# Patient Record
(demographics unavailable — no encounter records)

---

## 2025-02-27 NOTE — HISTORY OF PRESENT ILLNESS
[de-identified] : Abdullahi Gallardo is a 70 y/o M with PMH of pharyngeal cancer (s/p chemotherapy, radiation, and surgical resection) and GERD being referred for intrahepatic cholangiocarcinoma to discuss locoregional and systemic therapy options.   Patient was noted to have liver mass on CTA during work up for chest pain.  CTA 10/14/24 showed no evidence of dissection however a poorly defined hypodense mass in the hepatic dome with possible additional lesion in the inferior right hepatic lobe. These indeterminate but concerning for malignancy.   He had a follow up MRI abdomen 10/25/24 which showed a 4.2 cm lobulated mass within the central liver at the dome. This lesion enhances in the portal venous phase with persistent enhancement into the equilibrium phase. This solitary lesion could represent a cholangiocarcinoma. A metastatic lesion is possible. No ductal dilatation. No morphologic changes of cirrhosis. There were enlarged lymph nodes (around 1.6 mm). These were thought to maybe reflecting metastatic periportal lymphadenopathy. Tumor Markers 10/31/2024 showed CA 19-9 121, CEA 38.7.   He had a biopsy 11/8/2024 which was inconclusive. Repeat liver biopsy 12/12/2024 confirmed adenocarcinoma.  EGD/colonoscopy 11/20/24 DAVID.  He had PET scan 1/3/25 showed 6.4 cm hepatic dome mass and additional 1.5 cm segment 8 hepatic lesion, compatible with primary hepatic carcinoma vs. metastatic disease. Alva hepatic and aortocaval adenopathy. Increased sclerosis in the right iliac bone with mildly increased activity, concerning for metastatic disease given the above findings. Postsurgical changes in the left hypopharyngeal region.   MRI/MRCP 1/7/25 showed severe narrowing involving the midportion of the CBD possibly secondary to mass effect from the enlarging periportal and peripancreatic lymphadenopathy. Additionally there are intraluminal filling defects within the CBD just proximal to this region of stenosis which may reflect choledocholithiasis or tumor involvement. The results and narrowing of the CBD results in severe proximal intra and extrahepatic biliary ductal dilatation. Enlargement of the presumed hepatic neoplastic lesion along the hepatic dome which extends along the central aspects of the liver toward the alva hepatis. EUS/ERCP w/ stent placement 1/15/25. 1/22/25 CT AP showed mass within segments 4A and 8 consistent with known cholangiocarcinoma. The middle hepatic vein is not identified and may be occluded with tumor in vein. The mass also abuts the IVC although it is not clearly invaded. Middle hepatic vein is not seen, possibly invaded by tumor. Prominent right phrenic artery is just cephalad to the tumor but is not clearly parasitized by the tumor. Port hepatis and portacaval lymphadenopathy with increased FDG activity on recent PET from outside but not to review here today.   New fullness of the pancreatic head not present on outside imaging. Given recent ERCP this could reflect postprocedural pancreatitis. Sclerotic lesion in T10 has been present for 10 years and is unlikely to reflect metastatic disease.  Here today for second opinion and interested in surgery and ERICKA pump program  Social: Religious. 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 . One son who is on the phone with him   FH: brother prostate cancer w/ bone mets, brother skin cancer & bladder PSH: tonsillectomy (2011), L modified radial neck dissection (10/11/11), patellar tendon tear left knee (8/2013), quadriceps tendon repair right knee (2024) Smoking: former smoker, 1ppd x 10 years  ETOH: 1-2 drinks per week, stopped since dx drug use: none.     KRAS G12D CHANDLER PDL1 LDT: detected TMB low FISH analysis FGFR2 QNS  2/9/2025 Blood work:  Platelet 258 Ast/Alt/Alp 77/97/218, T.Bili 6.3, Albumin 3.7, Na 132, Cr 0.80 - GFR 95,  Markers: AFP negative, CA 19-9 121, CEA 38.7 (10/31/24)

## 2025-02-27 NOTE — HISTORY OF PRESENT ILLNESS
[de-identified] : Abdullahi Gallardo is a 72 y/o M with PMH of pharyngeal cancer (s/p chemotherapy, radiation, and surgical resection) and GERD being referred for intrahepatic cholangiocarcinoma to discuss locoregional and systemic therapy options.   Patient was noted to have liver mass on CTA during work up for chest pain.  CTA 10/14/24 showed no evidence of dissection however a poorly defined hypodense mass in the hepatic dome with possible additional lesion in the inferior right hepatic lobe. These indeterminate but concerning for malignancy.   He had a follow up MRI abdomen 10/25/24 which showed a 4.2 cm lobulated mass within the central liver at the dome. This lesion enhances in the portal venous phase with persistent enhancement into the equilibrium phase. This solitary lesion could represent a cholangiocarcinoma. A metastatic lesion is possible. No ductal dilatation. No morphologic changes of cirrhosis. There were enlarged lymph nodes (around 1.6 mm). These were thought to maybe reflecting metastatic periportal lymphadenopathy. Tumor Markers 10/31/2024 showed CA 19-9 121, CEA 38.7.   He had a biopsy 11/8/2024 which was inconclusive. Repeat liver biopsy 12/12/2024 confirmed adenocarcinoma.  EGD/colonoscopy 11/20/24 DAVID.  He had PET scan 1/3/25 showed 6.4 cm hepatic dome mass and additional 1.5 cm segment 8 hepatic lesion, compatible with primary hepatic carcinoma vs. metastatic disease. Alva hepatic and aortocaval adenopathy. Increased sclerosis in the right iliac bone with mildly increased activity, concerning for metastatic disease given the above findings. Postsurgical changes in the left hypopharyngeal region.   MRI/MRCP 1/7/25 showed severe narrowing involving the midportion of the CBD possibly secondary to mass effect from the enlarging periportal and peripancreatic lymphadenopathy. Additionally there are intraluminal filling defects within the CBD just proximal to this region of stenosis which may reflect choledocholithiasis or tumor involvement. The results and narrowing of the CBD results in severe proximal intra and extrahepatic biliary ductal dilatation. Enlargement of the presumed hepatic neoplastic lesion along the hepatic dome which extends along the central aspects of the liver toward the alva hepatis. EUS/ERCP w/ stent placement 1/15/25. 1/22/25 CT AP showed mass within segments 4A and 8 consistent with known cholangiocarcinoma. The middle hepatic vein is not identified and may be occluded with tumor in vein. The mass also abuts the IVC although it is not clearly invaded. Middle hepatic vein is not seen, possibly invaded by tumor. Prominent right phrenic artery is just cephalad to the tumor but is not clearly parasitized by the tumor. Port hepatis and portacaval lymphadenopathy with increased FDG activity on recent PET from outside but not to review here today.   New fullness of the pancreatic head not present on outside imaging. Given recent ERCP this could reflect postprocedural pancreatitis. Sclerotic lesion in T10 has been present for 10 years and is unlikely to reflect metastatic disease.  Here today for second opinion and interested in surgery and ERICKA pump program  Social: [employer]. 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 . One son who is on the phone with him   FH: brother prostate cancer w/ bone mets, brother skin cancer & bladder PSH: tonsillectomy (2011), L modified radial neck dissection (10/11/11), patellar tendon tear left knee (8/2013), quadriceps tendon repair right knee (2024) Smoking: former smoker, 1ppd x 10 years  ETOH: 1-2 drinks per week, stopped since dx drug use: none.     KRAS G12D CHANDLER PDL1 LDT: detected TMB low FISH analysis FGFR2 QNS  2/9/2025 Blood work:  Platelet 258 Ast/Alt/Alp 77/97/218, T.Bili 6.3, Albumin 3.7, Na 132, Cr 0.80 - GFR 95,  Markers: AFP negative, CA 19-9 121, CEA 38.7 (10/31/24)

## 2025-02-28 NOTE — HISTORY OF PRESENT ILLNESS
[de-identified] : 72 y/o M PHMx pharyngeal cancer s/p chemoRT & resection, GERD, orthostatic hypotension, carotid sinus hypersensitivity w/ cholangiocarcinoma   Patient was noted to have liver mass on CTA during work up for chest pain. CTA 10/14/24 showed no evidence of dissection however a poorly defined hypodense mass in the hepatic dome with possible additional lesion in the inferior right hepatic lobe. These indeterminate but concerning for malignancy. He had a follow up MRI abdomen 10/25/24 which showed a 4.2 cm lobulated mass within the central liver at the dome. This lesion enhances in the portal venous phase with persistent enhancement into the equilibrium phase. This solitary lesion could represent a cholangiocarcinoma. A metastatic lesion is possible. No ductal dilatation. No morphologic changes of cirrhosis. There were enlarged lymph nodes (around 1.6 mm). These were thought to maybe reflecting metastatic periportal lymphadenopathy. Tumor Markers 10/31/2024 showed CA 19-9 121, CEA 38.7. He had a biopsy 11/8/2024 which was inconclusive. Repeat liver biopsy 12/12/2024 confirmed adenocarcinoma. EGD/colonoscopy 11/20/24 DAVID. He had PET scan 1/3/25 showed 6.4 cm hepatic dome mass and additional 1.5 cm segment 8 hepatic lesion, compatible with primary hepatic carcinoma vs. metastatic disease. Carmine hepatic and aortocaval adenopathy. Increased sclerosis in the right iliac bone with mildly increased activity, concerning for metastatic disease given the above findings. Postsurgical changes in the left hypopharyngeal region. MRI/MRCP 1/7/25 showed severe narrowing involving the midportion of the CBD possibly secondary to mass effect from the enlarging periportal and peripancreatic lymphadenopathy. Additionally there are intraluminal filling defects within the CBD just proximal to this region of stenosis which may reflect choledocholithiasis or tumor involvement. The results and narrowing of the CBD results in severe proximal intra and extrahepatic biliary ductal dilatation. Enlargement of the presumed hepatic neoplastic lesion along the hepatic dome which extends along the central aspects of the liver toward the carmine hepatis. EUS/ERCP w/ stent placement 1/15/25. 1/22/25 CT AP showed mass within segments 4A and 8 consistent with known cholangiocarcinoma. The middle hepatic vein is not identified and may be occluded with tumor in vein. The mass also abuts the IVC although it is not clearly invaded. Classical hepatic arterial anatomy. Middle hepatic vein is not seen, possibly invaded by tumor. Prominent right phrenic artery is just cephalad to the tumor but is not clearly parasitized by the tumor. Port hepatis and portacaval lymphadenopathy with increased FDG activity on recent PET from outside. New fullness of the pancreatic head not present on outside imaging. Given recent ERCP this could reflect postprocedural pancreatitis. Sclerotic lesion in T10 has been present for 10 years and is unlikely to reflect metastatic disease.   Here today for second opinion and to discuss pump program   Social: [employer]. 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 He is 1 of 9 children  FH: brother prostate cancer w/ bone mets, brother skin cancer & bladder   PSH: tonsillectomy (2011), L modified radial neck dissection (10/11/11), patellar tendon tear left knee (8/2013), quadriceps tendon repair right knee (2024)  Smoking: former smoker, 1ppd x 10 years ETOH: 1-2 drinks per week, stopped since dx drug use: none   [de-identified] : KRAS G12D CHANDLER  PDL1 LDT: detected  TMB low  FISH analysis FGFR2 QNS [FreeTextEntry1] : initial visit.  [de-identified] : Here with his brother, Jatinder and his sister, Rebecca. His son, Major, is on via phone   Symptoms started one year ago. Admits to low appetite, fear of eating d/t hospitalization after eating heavy meal. Has been eating plant-based diet and using supplements. Ongoing weight loss.  +stomach discomfort with some nausea  Has some weakness and has lost muscle mass.  Gets lightheaded when he first gets up in the morning. Goes away after 2-3 hours. Will drink water, salt. Does feel dehydrated.  Hands and feet get very cold.  Independent in all ADLs.  + rash around the umbilicus and back  intermittent abdominal pain R and L side  every

## 2025-02-28 NOTE — HISTORY OF PRESENT ILLNESS
[de-identified] : 70 y/o M PHMx pharyngeal cancer s/p chemoRT & resection, GERD, orthostatic hypotension, carotid sinus hypersensitivity w/ cholangiocarcinoma   Patient was noted to have liver mass on CTA during work up for chest pain. CTA 10/14/24 showed no evidence of dissection however a poorly defined hypodense mass in the hepatic dome with possible additional lesion in the inferior right hepatic lobe. These indeterminate but concerning for malignancy. He had a follow up MRI abdomen 10/25/24 which showed a 4.2 cm lobulated mass within the central liver at the dome. This lesion enhances in the portal venous phase with persistent enhancement into the equilibrium phase. This solitary lesion could represent a cholangiocarcinoma. A metastatic lesion is possible. No ductal dilatation. No morphologic changes of cirrhosis. There were enlarged lymph nodes (around 1.6 mm). These were thought to maybe reflecting metastatic periportal lymphadenopathy. Tumor Markers 10/31/2024 showed CA 19-9 121, CEA 38.7. He had a biopsy 11/8/2024 which was inconclusive. Repeat liver biopsy 12/12/2024 confirmed adenocarcinoma. EGD/colonoscopy 11/20/24 DAVID. He had PET scan 1/3/25 showed 6.4 cm hepatic dome mass and additional 1.5 cm segment 8 hepatic lesion, compatible with primary hepatic carcinoma vs. metastatic disease. Carmine hepatic and aortocaval adenopathy. Increased sclerosis in the right iliac bone with mildly increased activity, concerning for metastatic disease given the above findings. Postsurgical changes in the left hypopharyngeal region. MRI/MRCP 1/7/25 showed severe narrowing involving the midportion of the CBD possibly secondary to mass effect from the enlarging periportal and peripancreatic lymphadenopathy. Additionally there are intraluminal filling defects within the CBD just proximal to this region of stenosis which may reflect choledocholithiasis or tumor involvement. The results and narrowing of the CBD results in severe proximal intra and extrahepatic biliary ductal dilatation. Enlargement of the presumed hepatic neoplastic lesion along the hepatic dome which extends along the central aspects of the liver toward the carmine hepatis. EUS/ERCP w/ stent placement 1/15/25. 1/22/25 CT AP showed mass within segments 4A and 8 consistent with known cholangiocarcinoma. The middle hepatic vein is not identified and may be occluded with tumor in vein. The mass also abuts the IVC although it is not clearly invaded. Classical hepatic arterial anatomy. Middle hepatic vein is not seen, possibly invaded by tumor. Prominent right phrenic artery is just cephalad to the tumor but is not clearly parasitized by the tumor. Port hepatis and portacaval lymphadenopathy with increased FDG activity on recent PET from outside. New fullness of the pancreatic head not present on outside imaging. Given recent ERCP this could reflect postprocedural pancreatitis. Sclerotic lesion in T10 has been present for 10 years and is unlikely to reflect metastatic disease.   Here today for second opinion and to discuss pump program   Social: [employer]. 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 He is 1 of 9 children  FH: brother prostate cancer w/ bone mets, brother skin cancer & bladder   PSH: tonsillectomy (2011), L modified radial neck dissection (10/11/11), patellar tendon tear left knee (8/2013), quadriceps tendon repair right knee (2024)  Smoking: former smoker, 1ppd x 10 years ETOH: 1-2 drinks per week, stopped since dx drug use: none   [de-identified] : KRAS G12D CHANDLER  PDL1 LDT: detected  TMB low  FISH analysis FGFR2 QNS [FreeTextEntry1] : initial visit.  [de-identified] : Here with his brother, Jatinder and his sister, Rebecca. His son, Major, is on via phone   Symptoms started one year ago. Admits to low appetite, fear of eating d/t hospitalization after eating heavy meal. Has been eating plant-based diet and using supplements. Ongoing weight loss.  +stomach discomfort with some nausea  Has some weakness and has lost muscle mass.  Gets lightheaded when he first gets up in the morning. Goes away after 2-3 hours. Will drink water, salt. Does feel dehydrated.  Hands and feet get very cold.  Independent in all ADLs.  + rash around the umbilicus and back  intermittent abdominal pain R and L side  every

## 2025-02-28 NOTE — HISTORY OF PRESENT ILLNESS
[de-identified] : 70 y/o M PHMx pharyngeal cancer s/p chemoRT & resection, GERD, orthostatic hypotension, carotid sinus hypersensitivity w/ cholangiocarcinoma   Patient was noted to have liver mass on CTA during work up for chest pain. CTA 10/14/24 showed no evidence of dissection however a poorly defined hypodense mass in the hepatic dome with possible additional lesion in the inferior right hepatic lobe. These indeterminate but concerning for malignancy. He had a follow up MRI abdomen 10/25/24 which showed a 4.2 cm lobulated mass within the central liver at the dome. This lesion enhances in the portal venous phase with persistent enhancement into the equilibrium phase. This solitary lesion could represent a cholangiocarcinoma. A metastatic lesion is possible. No ductal dilatation. No morphologic changes of cirrhosis. There were enlarged lymph nodes (around 1.6 mm). These were thought to maybe reflecting metastatic periportal lymphadenopathy. Tumor Markers 10/31/2024 showed CA 19-9 121, CEA 38.7. He had a biopsy 11/8/2024 which was inconclusive. Repeat liver biopsy 12/12/2024 confirmed adenocarcinoma. EGD/colonoscopy 11/20/24 DAVID. He had PET scan 1/3/25 showed 6.4 cm hepatic dome mass and additional 1.5 cm segment 8 hepatic lesion, compatible with primary hepatic carcinoma vs. metastatic disease. Carmine hepatic and aortocaval adenopathy. Increased sclerosis in the right iliac bone with mildly increased activity, concerning for metastatic disease given the above findings. Postsurgical changes in the left hypopharyngeal region. MRI/MRCP 1/7/25 showed severe narrowing involving the midportion of the CBD possibly secondary to mass effect from the enlarging periportal and peripancreatic lymphadenopathy. Additionally there are intraluminal filling defects within the CBD just proximal to this region of stenosis which may reflect choledocholithiasis or tumor involvement. The results and narrowing of the CBD results in severe proximal intra and extrahepatic biliary ductal dilatation. Enlargement of the presumed hepatic neoplastic lesion along the hepatic dome which extends along the central aspects of the liver toward the carmine hepatis. EUS/ERCP w/ stent placement 1/15/25. 1/22/25 CT AP showed mass within segments 4A and 8 consistent with known cholangiocarcinoma. The middle hepatic vein is not identified and may be occluded with tumor in vein. The mass also abuts the IVC although it is not clearly invaded. Classical hepatic arterial anatomy. Middle hepatic vein is not seen, possibly invaded by tumor. Prominent right phrenic artery is just cephalad to the tumor but is not clearly parasitized by the tumor. Port hepatis and portacaval lymphadenopathy with increased FDG activity on recent PET from outside. New fullness of the pancreatic head not present on outside imaging. Given recent ERCP this could reflect postprocedural pancreatitis. Sclerotic lesion in T10 has been present for 10 years and is unlikely to reflect metastatic disease.   Here today for second opinion and to discuss pump program   Social: [employer]. 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 He is 1 of 9 children  FH: brother prostate cancer w/ bone mets, brother skin cancer & bladder   PSH: tonsillectomy (2011), L modified radial neck dissection (10/11/11), patellar tendon tear left knee (8/2013), quadriceps tendon repair right knee (2024)  Smoking: former smoker, 1ppd x 10 years ETOH: 1-2 drinks per week, stopped since dx drug use: none   [de-identified] : KRAS G12D CHANDLER  PDL1 LDT: detected  TMB low  FISH analysis FGFR2 QNS [FreeTextEntry1] : initial visit.  [de-identified] : Here with his brother, Jatinder and his sister, Rebecca. His son, Major, is on via phone   Symptoms started one year ago. Admits to low appetite, fear of eating d/t hospitalization after eating heavy meal. Has been eating plant-based diet and using supplements. Ongoing weight loss.  +stomach discomfort with some nausea  Has some weakness and has lost muscle mass.  Gets lightheaded when he first gets up in the morning. Goes away after 2-3 hours. Will drink water, salt. Does feel dehydrated.  Hands and feet get very cold.  Independent in all ADLs.  + rash around the umbilicus and back  intermittent abdominal pain R and L side  every